# Patient Record
Sex: MALE | Race: OTHER | NOT HISPANIC OR LATINO | Employment: FULL TIME | URBAN - METROPOLITAN AREA
[De-identification: names, ages, dates, MRNs, and addresses within clinical notes are randomized per-mention and may not be internally consistent; named-entity substitution may affect disease eponyms.]

---

## 2024-07-16 ENCOUNTER — HOSPITAL ENCOUNTER (EMERGENCY)
Facility: HOSPITAL | Age: 19
Discharge: HOME | End: 2024-07-16
Attending: STUDENT IN AN ORGANIZED HEALTH CARE EDUCATION/TRAINING PROGRAM

## 2024-07-16 VITALS
SYSTOLIC BLOOD PRESSURE: 119 MMHG | TEMPERATURE: 99 F | DIASTOLIC BLOOD PRESSURE: 76 MMHG | BODY MASS INDEX: 16.92 KG/M2 | OXYGEN SATURATION: 100 % | HEIGHT: 67 IN | WEIGHT: 107.81 LBS | HEART RATE: 76 BPM | RESPIRATION RATE: 17 BRPM

## 2024-07-16 DIAGNOSIS — K59.00 CONSTIPATION, UNSPECIFIED CONSTIPATION TYPE: Primary | ICD-10-CM

## 2024-07-16 PROCEDURE — 99283 EMERGENCY DEPT VISIT LOW MDM: CPT | Mod: 25

## 2024-07-16 RX ORDER — DOCUSATE SODIUM 100 MG/1
100 CAPSULE, LIQUID FILLED ORAL 2 TIMES DAILY PRN
Qty: 20 CAPSULE | Refills: 0 | Status: SHIPPED | OUTPATIENT
Start: 2024-07-16 | End: 2024-07-26

## 2024-07-16 RX ORDER — POLYETHYLENE GLYCOL 3350 17 G/17G
17 POWDER, FOR SOLUTION ORAL DAILY PRN
Qty: 14 PACKET | Refills: 0 | Status: SHIPPED | OUTPATIENT
Start: 2024-07-16 | End: 2024-07-30

## 2024-07-16 NOTE — DISCHARGE INSTRUCTIONS
Life Style Changes:  Increase fiber in diet (prunes/kiwi)  10-12 nair per day  Exercise  Use medication PRN for severe constipation

## 2024-07-16 NOTE — ED PROVIDER NOTES
Encounter Date: 7/16/2024       History     Chief Complaint   Patient presents with    Abdominal Pain     C/o LLQ abdominal pain x8 months, denies NVD     17 y/o from [facility] presents to [facility] ER with a chief complaint of intermittent left lower abdominal cramping since arriving in the [place]  8 months ago. Patient reports the LLQ pain is more persitant than previous. Additional symptoms include bloating, gas, labile appetite and constipation. He has tried omeprazole and herbalife without much relief. His cramping is often occurring with a bowel movement. He denies fever, dysuria, nausea, vomiting, diarrhea, penile discharge. He has no other complaints.    Social  -tobacco/vape  +etoh social 1/mth  -substance    Medical Hx  None    Allergies  None    Surgical Hx  None    The history is provided by the patient. The history is limited by a language barrier. A  was used.     Review of patient's allergies indicates:  No Known Allergies  History reviewed. No pertinent past medical history.  No past surgical history on file.  No family history on file.     Review of Systems   Constitutional: Negative.    HENT: Negative.     Eyes: Negative.    Respiratory: Negative.     Cardiovascular: Negative.    Gastrointestinal:  Positive for abdominal pain and constipation. Negative for abdominal distention, anal bleeding, blood in stool, diarrhea, nausea, rectal pain and vomiting.   Genitourinary: Negative.    Musculoskeletal: Negative.    Skin: Negative.    Neurological: Negative.    Psychiatric/Behavioral: Negative.         Physical Exam     Initial Vitals [07/16/24 0806]   BP Pulse Resp Temp SpO2   119/76 76 17 98.6 °F (37 °C) 100 %      MAP       --         Physical Exam    Nursing note and vitals reviewed.  Constitutional: He appears well-developed and well-nourished. He is not diaphoretic. He is cooperative.  Non-toxic appearance. He does not have a sickly appearance. He does not appear ill. No  distress.   HENT:   Head: Normocephalic and atraumatic.   Right Ear: Hearing, tympanic membrane and external ear normal.   Left Ear: Hearing, tympanic membrane and external ear normal.   Nose: Nose normal.   Mouth/Throat: Uvula is midline, oropharynx is clear and moist and mucous membranes are normal.   Eyes: Conjunctivae and EOM are normal. Pupils are equal, round, and reactive to light. No scleral icterus.   Neck: Trachea normal and phonation normal. Neck supple.   Normal range of motion.  Cardiovascular:  Normal rate, regular rhythm, normal heart sounds, intact distal pulses and normal pulses.           Pulmonary/Chest: Effort normal and breath sounds normal. No accessory muscle usage. No apnea, no tachypnea and no bradypnea. No respiratory distress. He has no decreased breath sounds. He has no wheezes. He has no rhonchi. He has no rales. He exhibits no tenderness.   Normal effort, symmetrical chest rise and fall, no accessory muscle use. Bilateral clear breath sounds in all fields anterior and posterior.      Abdominal: Abdomen is soft and flat. Bowel sounds are normal. He exhibits no distension. There is no abdominal tenderness.   Abdomen is soft, nontender, no peritoneal signs. Tolerating PO fluids.      No right CVA tenderness.  No left CVA tenderness. There is no rebound, no guarding, no tenderness at McBurney's point and negative Epps's sign. negative psoas sign and negative Rovsing's sign  Musculoskeletal:         General: Normal range of motion.      Cervical back: Normal range of motion and neck supple.     Neurological: He is alert and oriented to person, place, and time. He has normal strength. Gait normal. GCS score is 15. GCS eye subscore is 4. GCS verbal subscore is 5. GCS motor subscore is 6.   Skin: Skin is warm and dry. Capillary refill takes less than 2 seconds. No rash noted.   Psychiatric: He has a normal mood and affect. His speech is normal and behavior is normal. Judgment and thought  content normal. Cognition and memory are normal.       ED Course   Procedures  Labs Reviewed - No data to display       Imaging Results              X-Ray Abdomen AP 1 View (Final result)  Result time 07/16/24 09:55:34      Final result by Neda Zuleta MD (07/16/24 09:55:34)                   Impression:      Moderate colonic stool burden.      Electronically signed by: Neda Zuleta  Date:    07/16/2024  Time:    09:55               Narrative:    EXAMINATION:  XR ABDOMEN AP 1 VIEW    CLINICAL HISTORY:  llq pain;    TECHNIQUE:  AP View(s) of the abdomen was performed.    COMPARISON:  None.    FINDINGS:  No dilated bowel loops. No evidence of obstruction.Moderate stool burden.    No appreciable acute osseous abnormality.                                       Medications - No data to display  Medical Decision Making  Patient works outside in the heat every day. Drinks 4-5 bottles of water  Radiology    Constipation  Life Style Changes:  Increase fiber in diet (prunes/kiwi)  10-12 nair per day  Exercise  Use medication PRN for severe constipation  Patient is nontoxic appearing, no acute distress, stable vital signs.   The patient is resting comfortably in no acute distress.  hemodynamically stable and is without objective evidence for acute process requiring urgent intervention or hospitalization. I provided counseling to patient with regard to condition, the treatment plan, specific conditions for return, and the importance of follow up. Detailed written and verbal instructions provided to patient and he expressed a verbal understanding of the discharge instructions and overall management plan. Reiterated the importance of medication administration and safety and advised patient to follow up with primary care provider in 3-5 days or sooner if needed. Answered questions at this time. The patient is stable for discharge.         Amount and/or Complexity of Data Reviewed  Radiology: ordered.    Risk  OTC  drugs.      Additional MDM:   Differential Diagnosis:   Appendicitis, Diverticulitis, Pancreatitis, Pyelonephritis, AAA, Dissection, MI, Gastric Ulcer, Peptic Ulcer, Urinary retention, among others                                        Clinical Impression:  Final diagnoses:  [K59.00] Constipation, unspecified constipation type (Primary)          ED Disposition Condition    Discharge Stable          ED Prescriptions       Medication Sig Dispense Start Date End Date Auth. Provider    docusate sodium (COLACE) 100 MG capsule Take 1 capsule (100 mg total) by mouth 2 (two) times daily as needed for Constipation. 20 capsule 7/16/2024 7/26/2024 Annalisa Morales FNP    polyethylene glycol (GLYCOLAX) 17 gram PwPk Take 17 g by mouth daily as needed (Constipation). 14 packet 7/16/2024 7/30/2024 Annalisa Morales FNP          Follow-up Information       Follow up With Specialties Details Why Contact Info    [facility] Emergency Medicine  If symptoms worsen [address] [facility] [id number] [phone]    PCP  In 3 days  Follow up with PCP in 3-5 days.             Annalisa Morales FNP  07/16/24 1012